# Patient Record
Sex: MALE | Race: WHITE | NOT HISPANIC OR LATINO | Employment: FULL TIME | URBAN - METROPOLITAN AREA
[De-identification: names, ages, dates, MRNs, and addresses within clinical notes are randomized per-mention and may not be internally consistent; named-entity substitution may affect disease eponyms.]

---
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
## 2020-06-23 PROBLEM — J98.4 PNEUMATOCELE OF LUNG: Status: ACTIVE | Noted: 2020-06-23

## 2020-06-23 PROBLEM — S06.5XAA SUBDURAL HEMATOMA: Status: ACTIVE | Noted: 2020-06-23

## 2020-06-23 PROBLEM — S12.601A CLOSED NONDISPLACED FRACTURE OF SEVENTH CERVICAL VERTEBRA: Status: ACTIVE | Noted: 2020-06-23

## 2020-06-23 PROBLEM — S02.19XA SPHENOID SINUS FRACTURE: Status: ACTIVE | Noted: 2020-06-23

## 2020-06-23 PROBLEM — K76.89 HEPATIC CYST: Status: ACTIVE | Noted: 2020-06-23

## 2020-06-23 PROBLEM — N28.89 RENAL MASS: Status: ACTIVE | Noted: 2020-06-23

## 2020-06-23 PROBLEM — S02.92XA FRACTURE OF FACIAL BONE: Status: ACTIVE | Noted: 2020-06-23
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
## 2020-06-23 PROBLEM — S37.019A PERINEPHRIC HEMATOMA: Status: ACTIVE | Noted: 2020-06-23

## 2020-06-24 PROBLEM — D62 ACUTE BLOOD LOSS ANEMIA: Status: ACTIVE | Noted: 2020-06-24

## 2020-06-24 PROBLEM — S06.5X6A: Status: ACTIVE | Noted: 2020-06-23

## 2020-06-24 PROBLEM — S62.626A CLOSED DISPLACED FRACTURE OF MIDDLE PHALANX OF RIGHT LITTLE FINGER: Status: ACTIVE | Noted: 2020-06-24

## 2020-06-24 PROBLEM — K76.89 HEPATIC CYST: Chronic | Status: ACTIVE | Noted: 2020-06-23

## 2020-06-25 PROBLEM — S63.259A: Status: ACTIVE | Noted: 2020-06-25

## 2020-07-15 ENCOUNTER — NURSE TRIAGE (OUTPATIENT)
Dept: ADMINISTRATIVE | Facility: CLINIC | Age: 60
End: 2020-07-15

## 2020-07-15 NOTE — TELEPHONE ENCOUNTER
Attempted to contact pt on behalf of [employer]. No answer.     Reason for Disposition   No answer.  First attempt to contact caller.  Follow-up call scheduled within 15 minutes.    Protocols used: NO CONTACT OR DUPLICATE CONTACT CALL-A-AH

## 2020-11-24 PROBLEM — M95.2 SKULL DEFECT: Status: ACTIVE | Noted: 2020-11-24

## 2020-12-01 PROBLEM — Z98.890 STATUS POST CRANIECTOMY: Status: ACTIVE | Noted: 2020-12-01

## 2021-09-30 PROBLEM — H40.023 OAG (OPEN ANGLE GLAUCOMA) SUSPECT, HIGH RISK, BILATERAL: Status: ACTIVE | Noted: 2021-09-30
